# Patient Record
(demographics unavailable — no encounter records)

---

## 2024-10-11 NOTE — DISCUSSION/SUMMARY
[de-identified] : Discussed proper post-op protocol and activity modifications Pt should remain in brace locked in extension  Plan for continued PT-Wb as tolerated in extension. D/C brace was quad control is demonstrated  f/u in 6 weeks  ----------------------------------------------------------------------------   All relevant imaging studies pertinent to today's visit, including x-rays, MRI's and/or other advanced imaging studies (CT/etc) were independently interpreted and reviewed with the patient as needed. Implications of the studies together with the patient's clinical picture were discussed to formulate a working diagnosis and management options were detailed.   The patient and/or guardian was advised of the diagnosis.  The natural history of the pathology was explained in full. All questions were answered.  The risks and benefits of conservative and interventional treatment alternatives were explained to the patient  The patient and/or guardian was advised if any advanced diagnostic/imaging study (MRI/CT/etc) is ordered to evaluate potential pathology in the affected area(s), they should follow up in the office to review the results of the study and determine further management that may be indicated.

## 2024-10-11 NOTE — IMAGING
[Left] : left knee [All Views] : anteroposterior, lateral, skyline, and anteroposterior standing [There are no fractures, subluxations or dislocations. No significant abnormalities are seen] : There are no fractures, subluxations or dislocations. No significant abnormalities are seen [de-identified] :   ----------------------------------------------------------------------------   Left knee exam:    incision CDI mild effusion knee straight  NVI

## 2024-10-11 NOTE — HISTORY OF PRESENT ILLNESS
[Sudden] : sudden [0] : 0 [Frequent] : frequent [Nothing helps with pain getting better] : Nothing helps with pain getting better [Standing] : standing [Walking] : walking [de-identified] : This is Ms. SHWETA ZAMORA  a 12 year old female who comes in today complaining of left knee pain since jumping while playing basketball yesterday and feeling like her knee went out of place.  She then landed on her knee.  has pain with walking and feels like knee is going to buckle while walking.     s/p Left knee arthroscopic lateral release, open tibial tubercle osteotomy/anterior medialization and distalization, and medial patellofemoral ligament reconstruction with semitendinosus allograft. (DOS: 08/26/2024) [] : no [FreeTextEntry3] : 3/21/24 [de-identified] : physical therapy

## 2024-10-11 NOTE — DATA REVIEWED
[MRI] : MRI [Left] : left [Knee] : knee [Report was reviewed and noted in the chart] : The report was reviewed and noted in the chart [I independently reviewed and interpreted images and report] : I independently reviewed and interpreted images and report [I reviewed the films/CD] : I reviewed the films/CD [FreeTextEntry1] : lateral patella tracking, trochlear dysplasia, bone contusion, lateral femoral condyle, TTG - 23, growth plates closing near fused